# Patient Record
Sex: MALE | Race: WHITE | NOT HISPANIC OR LATINO | URBAN - METROPOLITAN AREA
[De-identification: names, ages, dates, MRNs, and addresses within clinical notes are randomized per-mention and may not be internally consistent; named-entity substitution may affect disease eponyms.]

---

## 2019-08-29 ENCOUNTER — APPOINTMENT (RX ONLY)
Dept: URBAN - METROPOLITAN AREA CLINIC 128 | Facility: CLINIC | Age: 64
Setting detail: DERMATOLOGY
End: 2019-08-29

## 2019-08-29 DIAGNOSIS — L82.0 INFLAMED SEBORRHEIC KERATOSIS: ICD-10-CM

## 2019-08-29 DIAGNOSIS — Z71.89 OTHER SPECIFIED COUNSELING: ICD-10-CM

## 2019-08-29 DIAGNOSIS — L90.5 SCAR CONDITIONS AND FIBROSIS OF SKIN: ICD-10-CM

## 2019-08-29 PROCEDURE — 99202 OFFICE O/P NEW SF 15 MIN: CPT | Mod: 25

## 2019-08-29 PROCEDURE — ? LIQUID NITROGEN

## 2019-08-29 PROCEDURE — ? COUNSELING

## 2019-08-29 PROCEDURE — ? OTHER

## 2019-08-29 PROCEDURE — 17110 DESTRUCTION B9 LES UP TO 14: CPT

## 2019-08-29 ASSESSMENT — LOCATION SIMPLE DESCRIPTION DERM
LOCATION SIMPLE: LEFT SHOULDER
LOCATION SIMPLE: POSTERIOR NECK
LOCATION SIMPLE: RIGHT SHOULDER

## 2019-08-29 ASSESSMENT — LOCATION DETAILED DESCRIPTION DERM
LOCATION DETAILED: LEFT POSTERIOR SHOULDER
LOCATION DETAILED: LEFT SUPERIOR POSTERIOR NECK
LOCATION DETAILED: RIGHT ANTERIOR SHOULDER

## 2019-08-29 ASSESSMENT — LOCATION ZONE DERM
LOCATION ZONE: ARM
LOCATION ZONE: NECK

## 2019-08-29 NOTE — PROCEDURE: OTHER
Other (Free Text): Irritated Seborrheic Keratosis: Counseling. Reassurance. **NO ABN WILL BE SIGNED, BUT PATIENT IS AWARE THESE LESION WILL BE SUBMITTED TO THEIR INSURANCE AND STILL COULD GET A POTENTIAL KICK BACK IF RENDERED NOT NECESSARY**\\n \\n Patient is aware lesions are benign. \\n Patient is aware lesions have the potential to come back. \\n Patient are aware that lesions may fall off in 2-3 weeks. \\n Patient is aware that removal is by LN2. \\n Patient is aware that lesions may look worse before looking better (pink, puffy, scab, may or may not blister and peel). \\n Patient is aware they can use OTC Hydrocortisone 1% ointment to help with the itch and irritation. \\n Patient is aware to not pick or irritate lesions. \\n Patient is aware that lesions should fall off on their own. \\n Patient is aware scaring can happen after lesions have fallen off. \\n Patient is aware sun protection (30-50 SPF+) everyday to help prevent hyperpigmentation and hypopigmentation on areas removed. \\n Patient is aware that lesions will be submitted to insurance. \\n Patient was given LN2 handout. ; Follow up in 1 year.
Detail Level: Zone
Note Text (......Xxx Chief Complaint.): This diagnosis correlates with the

## 2019-08-29 NOTE — PROCEDURE: LIQUID NITROGEN
Include Z78.9 (Other Specified Conditions Influencing Health Status) As An Associated Diagnosis?: No
Duration Of Freeze Thaw-Cycle (Seconds): 10
Post-Care Instructions: I reviewed with the patient in detail post-care instructions. Patient is to wear sunprotection, and avoid picking at any of the treated lesions. Pt may apply Vaseline to crusted or scabbing areas.
Number Of Freeze-Thaw Cycles: 2 freeze-thaw cycles
Render Post-Care Instructions In Note?: yes
Consent: The patient's consent was obtained including but not limited to risks of crusting, scabbing, blistering, scarring, darker or lighter pigmentary change, recurrence, incomplete removal and infection.
Medical Necessity Information: It is in your best interest to select a reason for this procedure from the list below. All of these items fulfill various CMS LCD requirements except the new and changing color options.
Detail Level: Simple
Medical Necessity Clause: This procedure was medically necessary because the lesions that were treated were:
Pared With?: 15 blade

## 2019-08-29 NOTE — PROCEDURE: MIPS QUALITY
Detail Level: Generalized
Additional Notes: Pain 0/10
Quality 474: Zoster Vaccination Status: Shingrix Vaccination not Administered or Previously Received, Reason not Otherwise Specified
Quality 131: Pain Assessment And Follow-Up: Pain assessment using a standardized tool is documented as negative, no follow-up plan required
Quality 130: Documentation Of Current Medications In The Medical Record: Current Medications Documented